# Patient Record
Sex: FEMALE | Race: WHITE
[De-identification: names, ages, dates, MRNs, and addresses within clinical notes are randomized per-mention and may not be internally consistent; named-entity substitution may affect disease eponyms.]

---

## 2018-05-12 ENCOUNTER — HOSPITAL ENCOUNTER (EMERGENCY)
Dept: HOSPITAL 45 - C.EDB | Age: 18
Discharge: HOME | End: 2018-05-12
Payer: COMMERCIAL

## 2018-05-12 VITALS
HEIGHT: 65.98 IN | BODY MASS INDEX: 26.96 KG/M2 | BODY MASS INDEX: 26.96 KG/M2 | HEIGHT: 65.98 IN | WEIGHT: 167.77 LBS | WEIGHT: 167.77 LBS

## 2018-05-12 VITALS — OXYGEN SATURATION: 97 % | TEMPERATURE: 97.88 F

## 2018-05-12 VITALS — DIASTOLIC BLOOD PRESSURE: 79 MMHG | HEART RATE: 92 BPM | SYSTOLIC BLOOD PRESSURE: 124 MMHG

## 2018-05-12 DIAGNOSIS — Z84.1: ICD-10-CM

## 2018-05-12 DIAGNOSIS — Z80.9: ICD-10-CM

## 2018-05-12 DIAGNOSIS — Z83.3: ICD-10-CM

## 2018-05-12 DIAGNOSIS — Z79.3: ICD-10-CM

## 2018-05-12 DIAGNOSIS — R10.31: Primary | ICD-10-CM

## 2018-05-12 LAB
ALBUMIN SERPL-MCNC: 3.4 GM/DL (ref 3.4–5)
ALP SERPL-CCNC: 84 U/L (ref 45–117)
ALT SERPL-CCNC: 20 U/L (ref 12–78)
AST SERPL-CCNC: 12 U/L (ref 15–37)
BASOPHILS # BLD: 0.02 K/UL (ref 0–0.2)
BASOPHILS NFR BLD: 0.2 %
BUN SERPL-MCNC: 7 MG/DL (ref 7–18)
CALCIUM SERPL-MCNC: 9.1 MG/DL (ref 8.5–10.1)
CO2 SERPL-SCNC: 25 MMOL/L (ref 21–32)
CREAT SERPL-MCNC: 0.86 MG/DL (ref 0.6–1.2)
EOS ABS #: 0.11 K/UL (ref 0–0.5)
EOSINOPHIL NFR BLD AUTO: 307 K/UL (ref 130–400)
GLUCOSE SERPL-MCNC: 77 MG/DL (ref 70–99)
HCT VFR BLD CALC: 41 % (ref 37–47)
HGB BLD-MCNC: 14.1 G/DL (ref 12–16)
IG#: 0.01 K/UL (ref 0–0.02)
IMM GRANULOCYTES NFR BLD AUTO: 32.9 %
LIPASE: 152 U/L (ref 73–393)
LYMPHOCYTES # BLD: 2.8 K/UL (ref 1.2–3.4)
MCH RBC QN AUTO: 31.8 PG (ref 25–34)
MCHC RBC AUTO-ENTMCNC: 34.4 G/DL (ref 32–36)
MCV RBC AUTO: 92.6 FL (ref 80–100)
MONO ABS #: 0.51 K/UL (ref 0.11–0.59)
MONOCYTES NFR BLD: 6 %
NEUT ABS #: 5.05 K/UL (ref 1.4–6.5)
NEUTROPHILS # BLD AUTO: 1.3 %
NEUTROPHILS NFR BLD AUTO: 59.5 %
PMV BLD AUTO: 9.2 FL (ref 7.4–10.4)
POTASSIUM SERPL-SCNC: 3.7 MMOL/L (ref 3.5–5.1)
PROT SERPL-MCNC: 7.8 GM/DL (ref 6.4–8.2)
RED CELL DISTRIBUTION WIDTH CV: 12.2 % (ref 11.5–14.5)
RED CELL DISTRIBUTION WIDTH SD: 41.6 FL (ref 36.4–46.3)
SODIUM SERPL-SCNC: 139 MMOL/L (ref 136–145)
WBC # BLD AUTO: 8.5 K/UL (ref 4.8–10.8)

## 2018-05-12 NOTE — DIAGNOSTIC IMAGING REPORT
GALLBLADDER-ABD LIMITED



HISTORY:  17 years-old Female ruq pain acute right upper quadrant abdominal pain



COMPARISON: CT abdomen and pelvis of same day



TECHNIQUE: Multiple real-time sonographic images of the abdominal right upper

quadrant were obtained assessing grayscale appearance and color flow



FINDINGS: 

Pancreas is partially obscured by bowel gas with the imaged portions appearing

unremarkable. Liver is within normal limits without focal mass or intrahepatic

or ductal dilation. Common bile duct is normal, 3 mm. Gallbladder is within

normal limits and is mildly contracted without wall thickening, shadowing

cholelithiasis or pericholecystic fluid.



Imaged right kidney is unremarkable without hydronephrosis.



IMPRESSION: 

1. No cholelithiasis or sonographic evidence of acute cholecystitis.

2. No biliary ductal dilation. 







The above report was generated using voice recognition software. It may contain

grammatical, syntax or spelling errors.







Electronically signed by:  Larry Ragsdale M.D.

5/12/2018 2:51 PM



Dictated Date/Time:  5/12/2018 2:49 PM

## 2018-05-12 NOTE — DIAGNOSTIC IMAGING REPORT
ABD/PELVIS WITHOUT FOR STONE



HISTORY:  17 years-old Female flank pain acute right-sided flank pain



COMPARISON: Right upper quadrant ultrasound of same day



TECHNIQUE: Multiple axial CT images of the abdomen and pelvis were obtained

without the use of IV contrast. A dose lowering technique was used consistent

with the principals of BRYANNA.



FINDINGS: 

Lung bases are clear. No pneumatosis or pneumoperitoneum. Imaged inferior

cardiac chambers are unremarkable.



Liver, gallbladder, spleen, pancreas and adrenal glands are unremarkable.

Horseshoe kidney is noted without definite renal calculi or obstructive

uropathy. Ureters and bladder are unremarkable. Trace free pelvic fluid. Uterus

and adnexa are also within normal limits. The aorta is within normal limits. No

bulky adenopathy.



No bowel obstruction or focal bowel wall thickening. Air-filled appendix without

evidence of acute appendicitis. Appendiceal tip is not well seen. Soft tissues

and breast parenchyma appear unremarkable. Bones appear intact. Minimal

Schmorl's nodes throughout the spine.



IMPRESSION: 

1. Horseshoe kidney without renal calculi or obstructive uropathy.

2. No evidence of bowel obstruction, bowel wall thickening or acute

appendicitis.

3. Trace free pelvic fluid, likely physiologic.







The above report was generated using voice recognition software. It may contain

grammatical, syntax or spelling errors.







Electronically signed by:  Larry Ragsdale M.D.

5/12/2018 2:58 PM



Dictated Date/Time:  5/12/2018 2:52 PM

## 2018-05-12 NOTE — EMERGENCY ROOM VISIT NOTE
History


Report prepared by Miki:  Jarrod King


Under the Supervision of:  Dr. Kiel Trimble D.O.


First contact with patient:  13:30


Chief Complaint:  FLANK PAIN


Stated Complaint:  PAIN IN RIGHT SIDE





History of Present Illness


The patient is a 17 year old female who presents to the Emergency Room with 

complaints of worsening right flank pain for the past few days, and it is 

worsened with eating anything. She notes that the pain is not moving anywhere. 

The patient denies any nausea, vomiting, diarrhea, fever, constipation, and 

urinary symptoms. She states that her last period was on April 24th, and she 

denies any medical problems. The patient states that the pain did not start 

after doing anything specific. She notes that she has not been eating anything 

particularly fatty recently.





   Source of History:  patient


   Onset:  a few days ago


   Position:  other (right flank)


   Timing:  worsening


   Modifying Factors (Worsening):  eating


   Associated Symptoms:  No fevers, No nausea, No vomiting, No diarrhea, No 

urinary symptoms





Review of Systems


See HPI for pertinent positives & negatives. A total of 10 systems reviewed and 

were otherwise negative.





Family History





Cancer


Diabetes mellitus


Kidney disease


Kidney stones





Social History


Smoking Status:  Never Smoker


Marital Status:  single


Housing Status:  lives with family


Occupation Status:  employed





Current/Historical Medications


Scheduled


Birth Control Pills (Birth Control Pills), 1 TAB PO DAILY





Allergies


Coded Allergies:  


     No Known Allergies (Unverified , 9/22/11)





Physical Exam


Vital Signs











  Date Time  Temp Pulse Resp B/P (MAP) Pulse Ox O2 Delivery O2 Flow Rate FiO2


 


5/12/18 13:25 36.6 98 18 138/81 97 Room Air  











Physical Exam


CONSTITUTIONAL/VITAL SIGNS: Reviewed / noted above.


GENERAL: Non-toxic in appearance. 


INTEGUMENTARY: Warm, dry, and Pink.


HEAD: Normocephalic.


EYES: without scleral icterus or trauma.


ENT/OROPHARYNX: clear and moist.


LYMPHADENOPATHY/NECK: Is supple without lymphadenopathy or meningismus.


RESPIRATORY: Lungs clear and equal.


CARDIOVASCULAR: Regular rate and rhythm.


GI/ABDOMEN: Tenderness to the right upper and right lower quadrants. Soft. No 

organomegaly or pulsatile mass. No rebound or guarding. Normal bowel sounds.


EXTREMITIES: Warm and well perfused.


BACK: Right CVA tenderness.


NEUROLOGICAL: Intact without focal deficits. 


PSYCHIATRIC: normal affect.


MUSCULOSKELETAL: Normally developed with good muscle tone.





Medical Decision & Procedures


ER Provider


Diagnostic Interpretation:


Radiology results as stated below per my review and radiologist interpretation:








ABD/PELVIS WITHOUT FOR STONE





HISTORY:  17 years-old Female flank pain acute right-sided flank pain





COMPARISON: Right upper quadrant ultrasound of same day





TECHNIQUE: Multiple axial CT images of the abdomen and pelvis were obtained


without the use of IV contrast. A dose lowering technique was used consistent


with the principals of BRYANNA.





FINDINGS: 


Lung bases are clear. No pneumatosis or pneumoperitoneum. Imaged inferior


cardiac chambers are unremarkable.





Liver, gallbladder, spleen, pancreas and adrenal glands are unremarkable.


Horseshoe kidney is noted without definite renal calculi or obstructive


uropathy. Ureters and bladder are unremarkable. Trace free pelvic fluid. Uterus


and adnexa are also within normal limits. The aorta is within normal limits. No


bulky adenopathy.





No bowel obstruction or focal bowel wall thickening. Air-filled appendix without


evidence of acute appendicitis. Appendiceal tip is not well seen. Soft tissues


and breast parenchyma appear unremarkable. Bones appear intact. Minimal


Schmorl's nodes throughout the spine.





IMPRESSION: 


1. Horseshoe kidney without renal calculi or obstructive uropathy.


2. No evidence of bowel obstruction, bowel wall thickening or acute


appendicitis.


3. Trace free pelvic fluid, likely physiologic.





The above report was generated using voice recognition software. It may contain


grammatical, syntax or spelling errors.





Electronically signed by:  Larry Ragsdale M.D.


5/12/2018 2:58 PM





Dictated Date/Time:  5/12/2018 2:52 PM

















GALLBLADDER-ABD LIMITED





HISTORY:  17 years-old Female ruq pain acute right upper quadrant abdominal pain





COMPARISON: CT abdomen and pelvis of same day





TECHNIQUE: Multiple real-time sonographic images of the abdominal right upper


quadrant were obtained assessing grayscale appearance and color flow





FINDINGS: 


Pancreas is partially obscured by bowel gas with the imaged portions appearing


unremarkable. Liver is within normal limits without focal mass or intrahepatic


or ductal dilation. Common bile duct is normal, 3 mm. Gallbladder is within


normal limits and is mildly contracted without wall thickening, shadowing


cholelithiasis or pericholecystic fluid.





Imaged right kidney is unremarkable without hydronephrosis.





IMPRESSION: 


1. No cholelithiasis or sonographic evidence of acute cholecystitis.


2. No biliary ductal dilation. 





The above report was generated using voice recognition software. It may contain


grammatical, syntax or spelling errors.





Electronically signed by:  Larry Ragsdale M.D.


5/12/2018 2:51 PM





Dictated Date/Time:  5/12/2018 2:49 PM





Laboratory Results


5/12/18 14:05








Red Blood Count 4.43, Mean Corpuscular Volume 92.6, Mean Corpuscular Hemoglobin 

31.8, Mean Corpuscular Hemoglobin Concent 34.4, Mean Platelet Volume 9.2, 

Neutrophils (%) (Auto) 59.5, Lymphocytes (%) (Auto) 32.9, Monocytes (%) (Auto) 

6.0, Eosinophils (%) (Auto) 1.3, Basophils (%) (Auto) 0.2, Neutrophils # (Auto) 

5.05, Lymphocytes # (Auto) 2.80, Monocytes # (Auto) 0.51, Eosinophils # (Auto) 

0.11, Basophils # (Auto) 0.02





5/12/18 14:05

















Test


  5/12/18


13:31 5/12/18


14:05


 


White Blood Count


  


  8.50 K/uL


(4.8-10.8)


 


Red Blood Count


  


  4.43 M/uL


(4.2-5.4)


 


Hemoglobin


  


  14.1 g/dL


(12.0-16.0)


 


Hematocrit  41.0 % (37-47) 


 


Mean Corpuscular Volume


  


  92.6 fL


()


 


Mean Corpuscular Hemoglobin


  


  31.8 pg


(25-34)


 


Mean Corpuscular Hemoglobin


Concent 


  34.4 g/dl


(32-36)


 


Platelet Count


  


  307 K/uL


(130-400)


 


Mean Platelet Volume


  


  9.2 fL


(7.4-10.4)


 


Neutrophils (%) (Auto)  59.5 % 


 


Lymphocytes (%) (Auto)  32.9 % 


 


Monocytes (%) (Auto)  6.0 % 


 


Eosinophils (%) (Auto)  1.3 % 


 


Basophils (%) (Auto)  0.2 % 


 


Neutrophils # (Auto)


  


  5.05 K/uL


(1.4-6.5)


 


Lymphocytes # (Auto)


  


  2.80 K/uL


(1.2-3.4)


 


Monocytes # (Auto)


  


  0.51 K/uL


(0.11-0.59)


 


Eosinophils # (Auto)


  


  0.11 K/uL


(0-0.5)


 


Basophils # (Auto)


  


  0.02 K/uL


(0-0.2)


 


RDW Standard Deviation


  


  41.6 fL


(36.4-46.3)


 


RDW Coefficient of Variation


  


  12.2 %


(11.5-14.5)


 


Immature Granulocyte % (Auto)  0.1 % 


 


Immature Granulocyte # (Auto)


  


  0.01 K/uL


(0.00-0.02)


 


Anion Gap


  


  6.0 mmol/L


(3-11)


 


Est Creatinine Clear Calc


Drug Dose 


  109.6 ml/min 


 


 


Estimated GFR (


American) 


  113.5 


 


 


Estimated GFR (Non-


American 


  97.9 


 


 


BUN/Creatinine Ratio  8.2 (10-20) 


 


Calcium Level


  


  9.1 mg/dl


(8.5-10.1)


 


Total Bilirubin


  


  0.4 mg/dl


(0.2-1)


 


Direct Bilirubin


  


  0.1 mg/dl


(0-0.2)


 


Aspartate Amino Transf


(AST/SGOT) 


  12 U/L (15-37) 


 


 


Alanine Aminotransferase


(ALT/SGPT) 


  20 U/L (12-78) 


 


 


Alkaline Phosphatase


  


  84 U/L


()


 


Total Protein


  


  7.8 gm/dl


(6.4-8.2)


 


Albumin


  


  3.4 gm/dl


(3.4-5.0)


 


Lipase


  


  152 U/L


()








Laboratory results as stated above per my review.





Medications Administered











 Medications


  (Trade)  Dose


 Ordered  Sig/Larry


 Route  Start Time


 Stop Time Status Last Admin


Dose Admin


 


 Sodium Chloride  1,000 ml @ 


 999 mls/hr  [id number] STAT


 IV  5/12/18 13:31


 5/12/18 14:31 DC 5/12/18 13:31


999 MLS/HR











ED Course


1330: Previous medical records were reviewed. The patient was evaluated in room 

A4. A complete history and physical examination was performed.





1331: Sodium Chloride 1000 ml @ 999 mls/hr IV





1338: Morphine Sulfate 4mg IV





1509: On reevaluation, the patient is doing well. I discussed the results and 

findings with the patient. She verbalized agreement of the treatment plan. She 

was discharged home.





Medical Decision


Differential considered: pancreatitis, hepatitis, or acute cholecystitis, AAA,  

UTI, pyelonephritis, kidney stones, appendicitis, diverticulitis, shingles, 

bowel obstruction mesenteric ischemia, intussusception, hernia, ovarian torsion

, ruptured ovarian cyst,ectopic pregnancy, pregnancy.





This is a 17-year-old female who presents to the ED with a chief complaint of 

right sided abdominal flank pain started a few days ago.  She states that it 

seems to be worse with eating.  Her last menstrual period was 4/24/18.  She 

denies any abnormal vaginal discharge or bleeding.  She denies any urinary 

symptoms.  Her symptoms are rather continuous.  Her physical exam reveals 

tenderness to the right lower as well as right upper quadrant to palpation.  

There is also right CVA tenderness on exam.  CBC and complete metabolic panel 

was normal.  CT scan of the abdomen pelvis did not show acute process.  

Ultrasound of the gallbladder was normal.  The patient was told the results of 

the test.  She is felt to be stable for discharge and outpatient follow-up.





Medication Reconcilliation


Current Medication List:  was personally reviewed by me





Blood Pressure Screening


Patient's blood pressure:  Elevated blood pressure


Blood pressure disposition:  Elevated BP felt to be situational





Impression





 Primary Impression:  


 Right sided abdominal pain





Scribe Attestation


The scribe's documentation has been prepared under my direction and personally 

reviewed by me in its entirety. I confirm that the note above accurately 

reflects all work, treatment, procedures, and medical decision making performed 

by me.





Departure Information


Dispostion


Home / Self-Care





Referrals


German Sainz M.D. (PCP)





Forms


HOME CARE DOCUMENTATION FORM,                                                 

               IMPORTANT VISIT INFORMATION





Patient Instructions


My [facility]





Additional Instructions





The ultrasound of your gallbladder and the CAT scan of your abdomen today did 

not show a reason for your symptoms.





Your blood results were normal as well.





Follow-up with your doctor for further care and evaluation in 1-2 days.  Return 

to the emergency department for worsening or new symptoms or any concerns.


You have been examined and treated today on an emergency basis only. This is 

not a substitute for, or an effort to provide, complete comprehensive medical 

care. It is impossible to recognize and treat all injuries or illnesses in a 

single emergency department visit. It is therefore important that you follow up 

closely with your doctor.  Call as soon as possible for an appointment.